# Patient Record
(demographics unavailable — no encounter records)

---

## 2024-10-21 NOTE — HEALTH RISK ASSESSMENT
[No] : In the past 12 months have you used drugs other than those required for medical reasons? No [0] : 2) Feeling down, depressed, or hopeless: Not at all (0) [PHQ-2 Negative - No further assessment needed] : PHQ-2 Negative - No further assessment needed [[id number]] : 0 [Never] : Never

## 2024-10-21 NOTE — HISTORY OF PRESENT ILLNESS
[FreeTextEntry1] : follow up chronic medical conditions.  [de-identified] : Ms. NEIL ESCOBAR is a 65 year female with a PMH of HLD (non compliant with statin medication) DM comes to the office for follow up of chronic medical conditions. Patient denies fever, cough SOB. No other complaints at this time.

## 2024-10-21 NOTE — PLAN
[FreeTextEntry1] : Trigger finger- patient was referred to Orthopedic hand surgeon.   Iron deficiency- patient will follow with Heme/Onc for iron infusions  HLD- patient non-compliant with statin medication, will test lipid panel today and call patient with results  DM- Patient will continue metformin 500 mg PO BID and Ozempic 2 mg SQ Qweekly  Prior to appointment and during encounter with patient extensive medical records were reviewed including but not limited to, Hospital records, out patient records, laboratory data and microbiology data    Total encounter total time 30 mins >50% of time spent counseling/coordinating care  Counseling included abnormal lab results, differential diagnoses, treatment options, risks and benefits, lifestyle changes, current condition, medications, and dose adjustments.  The patient was interactive, attentive, asked questions, and verbalized understanding

## 2024-10-21 NOTE — HISTORY OF PRESENT ILLNESS
[FreeTextEntry1] : follow up chronic medical conditions.  [de-identified] : Ms. NEIL ESCOBAR is a 65 year female with a PMH of HLD (non compliant with statin medication) DM comes to the office for follow up of chronic medical conditions. Patient denies fever, cough SOB. No other complaints at this time.

## 2024-10-21 NOTE — HEALTH RISK ASSESSMENT
[No] : In the past 12 months have you used drugs other than those required for medical reasons? No [0] : 2) Feeling down, depressed, or hopeless: Not at all (0) [PHQ-2 Negative - No further assessment needed] : PHQ-2 Negative - No further assessment needed [ZTQ1Vortc] : 0 [Never] : Never

## 2025-01-02 NOTE — HEALTH RISK ASSESSMENT
[Good] : ~his/her~  mood as  good [No] : In the past 12 months have you used drugs other than those required for medical reasons? No [No falls in past year] : Patient reported no falls in the past year [Little interest or pleasure doing things] : 1) Little interest or pleasure doing things [Feeling down, depressed, or hopeless] : 2) Feeling down, depressed, or hopeless [Patient refused screening] : Patient refused screening [PHQ-2 Negative - No further assessment needed] : PHQ-2 Negative - No further assessment needed [Never] : Never [Fully functional (bathing, dressing, toileting, transferring, walking, feeding)] : Fully functional (bathing, dressing, toileting, transferring, walking, feeding) [Fully functional (using the telephone, shopping, preparing meals, housekeeping, doing laundry, using] : Fully functional and needs no help or supervision to perform IADLs (using the telephone, shopping, preparing meals, housekeeping, doing laundry, using transportation, managing medications and managing finances) [Patient reported colonoscopy was normal] : Patient reported colonoscopy was normal [0] : 2) Feeling down, depressed, or hopeless: Not at all (0) [BGQ1Mctsz] : 0 [Patient declined mammogram] : Patient declined mammogram [Patient declined PAP Smear] : Patient declined PAP Smear [Patient declined bone density test] : Patient declined bone density test [Change in mental status noted] : No change in mental status noted [Language] : denies difficulty with language [Behavior] : denies difficulty with behavior [Learning/Retaining New Information] : denies difficulty learning/retaining new information [Alone] : lives alone [Single] : single [# Of Children ___] : has [unfilled] children [Reports changes in hearing] : Reports no changes in hearing [Reports changes in vision] : Reports no changes in vision [Reports normal functional visual acuity (ie: able to read med bottle)] : Reports poor functional visual acuity.  [Reports changes in dental health] : Reports no changes in dental health [MammogramDate] : 2020 [MammogramComments] : PT refused mammogram [PapSmearDate] : 2020 [PapSmearComments] : Pt refused PAP [ColonoscopyDate] : 2022 [de-identified] : Lives with dogs x 2

## 2025-01-02 NOTE — HISTORY OF PRESENT ILLNESS
[FreeTextEntry1] : Annual Physical  [de-identified] : 66-year-old female GERD on omeprazole, prediabetes on metformin and Ozempic, anemia of unknown origin, receives iron transfusions last transfusion was 1 month ago, history multiple blood transfusion. Presents to \[facility]\"" care. Seen by hematology at [facility]. Has chronic lumbar back pain. Follows with pain medicine at [facility]. Scheduled for b/l lumbar nerve ablation with Dr. Manzo 1/9 January 2025.  History of allergic reaction to blood transfusion. History of pulmonary embolism 2017 Multiple gastric surgeries and 13 inches of bowel resection. Multiple mesh hernia repairs.  Ozempic holding for procedure. Previous history of morbid obesity almost 500 lbs. Patient reports she fasted for an extended period of time lost over 200 pounds. At this time patient was evaluated by psychiatry due to refusal to eat. Was never admitted to psych hospital. Patient states she was pronounced dead and put into a morgue before hospital services realizing she was still alive.  History of taking large amounts of aspirin >24 pills in a few hour span. Has a deep mistrust in physicians due to lifetime of medical complications. Mother history of breast cancer with metastasis.   Follows with hematologist.  Denies SI/HI.

## 2025-01-02 NOTE — ASSESSMENT
[FreeTextEntry1] : 66-year-old female GERD on omeprazole, prediabetes and metformin, anemia of unknown origin, receives iron transfusions last transfusion was 1 month ago, history multiple blood transfusion. Presents to establish care.  # Chronic lumbar back pain.  Follows with pain medicine at [facility].  Scheduled for b/l lumbar nerve ablation with Dr. Manzo 1/9 January 2025.  #Iron Deficiency Anemia Follows with hematology will trend h/h - reviewed lab work at [facility] done on 12/26.  #Prediabetes  Continue metformin and Ozempic Sent A1c today will trend.  Patient denies routine health screening and appropriate vaccinations. Colonoscopy up to date.

## 2025-03-14 NOTE — HISTORY OF PRESENT ILLNESS
[de-identified] : The patient is a 66 year old right hand dominant female who presents today for LT KNEE pain. Date of Injury/Onset: October 2024 Pain: At Rest: 3/10 With Activity: 9/10 Mechanism of injury: This is NOT a Work Related Injury being treated under Worker's Compensation. This is NOT an athletic injury occurring associated with an interscholastic or organized sports team. Quality of symptoms: constant dull ache at rest, sharp pain posteriorly with movement Improves with: rest, activity modification  Worse with: inc walking, stairs, side sleeping Prior treatment: dx with baker cyst October 2024 by infusion physician (dx with US- was receiving iron and blood infusions)) Prior Imaging: US @ [facility] Out of work/sport: currently working  School/Sport/Position/Occupation:   Additional Information: reports medical hx of weight loss in the early 90's (was over 400 lbs) that resulted in inc skin. Interested in discussing gel treatment options   **diabetic**

## 2025-03-14 NOTE — IMAGING
[Left] : left knee [de-identified] : The patient is a well appearing 66 year old female of their stated age. Patient ambulates with a normal gait. Negative straight leg raise bilateral   Effected Knee: LEFT ROM:  0-130 degrees Lachman: Negative Pivot Shift: Negative Anterior Drawer: Negative Posterior Drawer / Sag: Negative Varus Stress 0 degrees: Stable Varus Stress 30 degrees: Stable Valgus Stress 0 degrees: Stable Valgus Stress 30 degrees: Stable Medial Marty: Negative Lateral Marty: Negative Patella Glide: 2+ Patella Apprehension: Negative Patella Grind: Negative   Palpation: Medial Joint Line: Nontender Lateral Joint Line: Nontender Medial Collateral Ligament: Nontender Lateral Collateral Ligament/PLC: Nontender Distal Femur: Nontender Proximal Tibia: Nontender Tibial Tubercle: Nontender Distal Pole Patella: Nontender Quadriceps Tendon: Nontender &  Intact Patella Tendon: Nontender &  Intact Medial Femoral Condyle: Nontender Medial Distal Hamstring/PES: Nontender Lateral Distal Hamstring: Nontender & Stable Iliotibial Band: Nontender Medial Patellofemoral Ligament: Nontender Adductor: Nontender Proximal GSC-Plantaris: Nontender Calf: Supple & Nontender   Inspection: Deformity: No Erythema: No Ecchymosis: No Abrasions: No Effusion: MILD Prepatella Bursitis: No Neurologic Exam: Sensation L4-S1: Grossly Intact Motor Exam: Quadriceps: 5 out of 5 Hamstrings: 5 out of 5 EHL: 5 out of 5 FHL: 5 out of 5 TA: 5 out of 5 GS: 5 out of 5 Circulatory/Pulses: Dorsalis Pedis: 2+ Posterior Tibialis: 2+ Additional Pertinent Findings: None     Contralateral Knee:                       ROM: 0-145 degrees Other Pertinent Findings: None   Assessment: The patient is a 66 year old female with Left knee pain and radiographic and physical exam findings consistent with patellofemoral OA The patient's condition is acute Documents/Results Reviewed Today: X-Ray left knee Tests/Studies Independently Interpreted Today: X-Ray left knee reveals evidence of end stage patellofemoral arthritis  Pertinent findings include: 0-140, Palpable baker's cyst, patellofemoral crepitus, Mild effusion Confounding medical conditions/concerns reports medical hx of weight loss in the early 90's (was over 400 lbs) that resulted in inc skin. Interested in discussing gel treatment options **diabetic**    Plan:  Discussed treatment options for the patient's patellofemoral arthritis, both operative vs non-operative. Patient will start physical therapy and HEP to focus on strengthening. Advised patient to obtain Reparel sleeve. Discussed taking Voltaren Gel. The patient is aware and understands that if she fails all conservative treatment modalities, she should consider a total knee arthroplasty. In the interim, we will focus on conservative management of arthritic related pain. Modify activity as discussed. Discussed treatment options in the form of injections to aid in pain, inflammation, and discomfort. Patient elected to receive Left knee Gelone. Advised patient to rest and ice the area as tolerated. Tests Ordered: None Prescription Medications Ordered: Discussed appropriate use of OTC anti-inflammatories and analgesics Tylenol Braces/DME Ordered: None Activity/Work/Sports Status: None Additional Instructions: Voltaren gel  Follow-Up: 1 week for right knee  Procedure Note: Musculoskeletal Injection   Diagnosis: Left knee OA Procedure: Left knee, superolateral, Gel One   Indication: The patient has had persistent pain despite conservative treatment.  Risks, benefits and alternatives to procedure were discussed; all questions were answered to the patient's apparent satisfaction and informed consent obtained. Consent form was signed and dated today.  The patient denied prior problems with local anesthetics, injectable cortisones, chicken allergy, coagulopathy and no relevant drug or preservative allergies or sensitivities.   The area of injection was prepared in a sterile fashion.  Prior to injection a 'Time Out' was conducted in accordance with [facility] policy and the site and nature of procedure verified with the patient.   Procedure: The injection and aspiration was carried out utilizing sterile technique from a superolateral arthroscopic portal position with needle placement under ultrasound guidance to improve accuracy and minimize risk to the patient and:    (X) Diagnostic ultrasound in the long and short axis revealed OA   0cc of clear synovial fluid was aspirated. The specimen: (X) appeared benign and was discarded ( ) was sent for Culture / Cell Count / Crystal analysis / [_].    Injection into the target area with care taken to aspirate frequently to minimize the risk of intravascular injection was performed with:    ( ) 1cc of Depomedrol (80mg/ml) ( ) 1cc of Dexamethasone (10mg/ml) ( ) 1cc of Toradol (30mg/ml) ( ) 9cc of 0.5% Bupivacaine (X) 5cc of 1% Lidocaine ( ) 5cc of 32mg Zilretta, prepared and diluted per  instructions ( ) 2 cc of Hylan G-F 20 (Synvisc) 16mg/2ml ( ) 6 cc of Hylan G-F 20 (SynvisoOne) 16mg/2ml ( ) 2cc of Euflexxa ( ) 2cc of Orthovisc (X) 2cc of GelOne ( ) 3cc of Durolane (20mg/ml)    Patient tolerated the procedure well and direct pressure was applied for hemostasis. The patient was reminded of potential post-injection risks including, but not limited to, delayed hypersensitivity reactions and/or infection.  The patient verified that they had the office and the Emergency Room's contact information if any problems should arise.  After several minutes, the patient informed me that they felt fine and was released from the office.  The patient's current medication management of their orthopedic diagnosis was reviewed today: The patient declined and was contraindicated for the recommended prescription medication Naprosyn secondary to gastric bypass surgery and will use over the counter Voltaren Gel as directed. (1) We discussed a comprehensive treatment plan that included possible pharmaceutical management involving the use of prescription strength medications including but not limited to options such as oral Naprosyn 500mg BID, once daily Meloxicam 15 mg, or 500-650 mg Tylenol versus over the counter oral medications and topical prescription NSAID Pennsaid vs over the counter Voltaren gel.  Based on our extensive discussion, the patient declined prescription medication as they are contraindicated as mentioned above and will use over the counter Voltaren Gel or Tylenol as directed. (2) There is a moderate risk of morbidity with further treatment, especially from use of prescription strength medications and possible side effects of these medications which include upset stomach with oral medications, skin reactions to topical medications and cardiac/renal issues with long term use. (3) I recommended that the patient follow-up with their medical physician to discuss any significant specific potential issues with long term medication use such as interactions with current medications or with exacerbation of underlying medical comorbidities. (4) The benefits and risks associated with use of injectable, oral or topical, prescription and over the counter anti-inflammatory medications were discussed with the patient. The patient voiced understanding of the risks including but not limited to bleeding, stroke, kidney dysfunction, heart disease, and were referred to the black box warning label for further information.  I, Daly Berrios attest that this documentation has been prepared under the direction and in the presence of Shazia Chester PA-C.  The documentation recorded by the scribe accurately reflects the service IShazia PA-C, personally performed and the decisions made by me. [FreeTextEntry9] : X-Ray left knee reveals evidence of end stage patellofemoral arthritis

## 2025-03-14 NOTE — HISTORY OF PRESENT ILLNESS
[de-identified] : The patient is a 66 year old right hand dominant female who presents today for LT KNEE pain. Date of Injury/Onset: October 2024 Pain: At Rest: 3/10 With Activity: 9/10 Mechanism of injury: This is NOT a Work Related Injury being treated under Worker's Compensation. This is NOT an athletic injury occurring associated with an interscholastic or organized sports team. Quality of symptoms: constant dull ache at rest, sharp pain posteriorly with movement Improves with: rest, activity modification  Worse with: inc walking, stairs, side sleeping Prior treatment: dx with baker cyst October 2024 by infusion physician (dx with US- was receiving iron and blood infusions)) Prior Imaging: US @ [facility] Out of work/sport: currently working  School/Sport/Position/Occupation:   Additional Information: reports medical hx of weight loss in the early 90's (was over 400 lbs) that resulted in inc skin. Interested in discussing gel treatment options   **diabetic**

## 2025-03-14 NOTE — IMAGING
[Left] : left knee [de-identified] : The patient is a well appearing 66 year old female of their stated age. Patient ambulates with a normal gait. Negative straight leg raise bilateral   Effected Knee: LEFT ROM:  0-130 degrees Lachman: Negative Pivot Shift: Negative Anterior Drawer: Negative Posterior Drawer / Sag: Negative Varus Stress 0 degrees: Stable Varus Stress 30 degrees: Stable Valgus Stress 0 degrees: Stable Valgus Stress 30 degrees: Stable Medial Marty: Negative Lateral Marty: Negative Patella Glide: 2+ Patella Apprehension: Negative Patella Grind: Negative   Palpation: Medial Joint Line: Nontender Lateral Joint Line: Nontender Medial Collateral Ligament: Nontender Lateral Collateral Ligament/PLC: Nontender Distal Femur: Nontender Proximal Tibia: Nontender Tibial Tubercle: Nontender Distal Pole Patella: Nontender Quadriceps Tendon: Nontender &  Intact Patella Tendon: Nontender &  Intact Medial Femoral Condyle: Nontender Medial Distal Hamstring/PES: Nontender Lateral Distal Hamstring: Nontender & Stable Iliotibial Band: Nontender Medial Patellofemoral Ligament: Nontender Adductor: Nontender Proximal GSC-Plantaris: Nontender Calf: Supple & Nontender   Inspection: Deformity: No Erythema: No Ecchymosis: No Abrasions: No Effusion: MILD Prepatella Bursitis: No Neurologic Exam: Sensation L4-S1: Grossly Intact Motor Exam: Quadriceps: 5 out of 5 Hamstrings: 5 out of 5 EHL: 5 out of 5 FHL: 5 out of 5 TA: 5 out of 5 GS: 5 out of 5 Circulatory/Pulses: Dorsalis Pedis: 2+ Posterior Tibialis: 2+ Additional Pertinent Findings: None     Contralateral Knee:                       ROM: 0-145 degrees Other Pertinent Findings: None   Assessment: The patient is a 66 year old female with Left knee pain and radiographic and physical exam findings consistent with patellofemoral OA The patient's condition is acute Documents/Results Reviewed Today: X-Ray left knee Tests/Studies Independently Interpreted Today: X-Ray left knee reveals evidence of end stage patellofemoral arthritis  Pertinent findings include: 0-140, Palpable baker's cyst, patellofemoral crepitus, Mild effusion Confounding medical conditions/concerns reports medical hx of weight loss in the early 90's (was over 400 lbs) that resulted in inc skin. Interested in discussing gel treatment options **diabetic**    Plan:  Discussed treatment options for the patient's patellofemoral arthritis, both operative vs non-operative. Patient will start physical therapy and HEP to focus on strengthening. Advised patient to obtain Reparel sleeve. Discussed taking Voltaren Gel. The patient is aware and understands that if she fails all conservative treatment modalities, she should consider a total knee arthroplasty. In the interim, we will focus on conservative management of arthritic related pain. Modify activity as discussed. Discussed treatment options in the form of injections to aid in pain, inflammation, and discomfort. Patient elected to receive Left knee Gelone. Advised patient to rest and ice the area as tolerated. Tests Ordered: None Prescription Medications Ordered: Discussed appropriate use of OTC anti-inflammatories and analgesics Tylenol Braces/DME Ordered: None Activity/Work/Sports Status: None Additional Instructions: Voltaren gel  Follow-Up: 1 week for right knee  Procedure Note: Musculoskeletal Injection   Diagnosis: Left knee OA Procedure: Left knee, superolateral, Gel One   Indication: The patient has had persistent pain despite conservative treatment.  Risks, benefits and alternatives to procedure were discussed; all questions were answered to the patient's apparent satisfaction and informed consent obtained. Consent form was signed and dated today.  The patient denied prior problems with local anesthetics, injectable cortisones, chicken allergy, coagulopathy and no relevant drug or preservative allergies or sensitivities.   The area of injection was prepared in a sterile fashion.  Prior to injection a 'Time Out' was conducted in accordance with [facility] policy and the site and nature of procedure verified with the patient.   Procedure: The injection and aspiration was carried out utilizing sterile technique from a superolateral arthroscopic portal position with needle placement under ultrasound guidance to improve accuracy and minimize risk to the patient and:    (X) Diagnostic ultrasound in the long and short axis revealed OA   0cc of clear synovial fluid was aspirated. The specimen: (X) appeared benign and was discarded ( ) was sent for Culture / Cell Count / Crystal analysis / [_].    Injection into the target area with care taken to aspirate frequently to minimize the risk of intravascular injection was performed with:    ( ) 1cc of Depomedrol (80mg/ml) ( ) 1cc of Dexamethasone (10mg/ml) ( ) 1cc of Toradol (30mg/ml) ( ) 9cc of 0.5% Bupivacaine (X) 5cc of 1% Lidocaine ( ) 5cc of 32mg Zilretta, prepared and diluted per  instructions ( ) 2 cc of Hylan G-F 20 (Synvisc) 16mg/2ml ( ) 6 cc of Hylan G-F 20 (SynvisoOne) 16mg/2ml ( ) 2cc of Euflexxa ( ) 2cc of Orthovisc (X) 2cc of GelOne ( ) 3cc of Durolane (20mg/ml)    Patient tolerated the procedure well and direct pressure was applied for hemostasis. The patient was reminded of potential post-injection risks including, but not limited to, delayed hypersensitivity reactions and/or infection.  The patient verified that they had the office and the Emergency Room's contact information if any problems should arise.  After several minutes, the patient informed me that they felt fine and was released from the office.  The patient's current medication management of their orthopedic diagnosis was reviewed today: The patient declined and was contraindicated for the recommended prescription medication Naprosyn secondary to gastric bypass surgery and will use over the counter Voltaren Gel as directed. (1) We discussed a comprehensive treatment plan that included possible pharmaceutical management involving the use of prescription strength medications including but not limited to options such as oral Naprosyn 500mg BID, once daily Meloxicam 15 mg, or 500-650 mg Tylenol versus over the counter oral medications and topical prescription NSAID Pennsaid vs over the counter Voltaren gel.  Based on our extensive discussion, the patient declined prescription medication as they are contraindicated as mentioned above and will use over the counter Voltaren Gel or Tylenol as directed. (2) There is a moderate risk of morbidity with further treatment, especially from use of prescription strength medications and possible side effects of these medications which include upset stomach with oral medications, skin reactions to topical medications and cardiac/renal issues with long term use. (3) I recommended that the patient follow-up with their medical physician to discuss any significant specific potential issues with long term medication use such as interactions with current medications or with exacerbation of underlying medical comorbidities. (4) The benefits and risks associated with use of injectable, oral or topical, prescription and over the counter anti-inflammatory medications were discussed with the patient. The patient voiced understanding of the risks including but not limited to bleeding, stroke, kidney dysfunction, heart disease, and were referred to the black box warning label for further information.  I, Daly Berrios attest that this documentation has been prepared under the direction and in the presence of Shazia Chester PA-C.  The documentation recorded by the scribe accurately reflects the service IShazia PA-C, personally performed and the decisions made by me. [FreeTextEntry9] : X-Ray left knee reveals evidence of end stage patellofemoral arthritis

## 2025-03-21 NOTE — HISTORY OF PRESENT ILLNESS
[de-identified] : The patient is a 66 year old right hand dominant female who presents today for LT KNEE pain. Date of Injury/Onset: October 2024 Pain: At Rest: 3/10 With Activity: 9/10 Mechanism of injury: This is NOT a Work Related Injury being treated under Worker's Compensation. This is NOT an athletic injury occurring associated with an interscholastic or organized sports team. Quality of symptoms: constant dull ache at rest, sharp pain posteriorly with movement Improves with: rest, activity modification  Worse with: inc walking, stairs, side sleeping Treatment/Imaging/Studies Since Last Visit: gel one injection 3/13/25 	Reports Available For Review Today: none Changes since last visit: Patient reports pain inc since injection. C/o shin pain at night that started after injection. Would like to discuss baker cyst treatment at this time. Would like to wait on RT knee injection since no x-rays taken yet Out of work/sport: currently working  School/Sport/Position/Occupation:   Additional Information: reports medical hx of weight loss in the early 90's (was over 400 lbs) that resulted in inc skin. Interested in discussing gel treatment options   **diabetic**

## 2025-03-21 NOTE — IMAGING
[Right] : right knee [AP] : anteroposterior [Lateral] : lateral [[place]] : skyline [AP Standing] : anteroposterior standing [de-identified] : The patient is a well appearing 66 year old female of their stated age. Patient ambulates with a normal gait. Negative straight leg raise bilateral   Effected Knee: RIGHT  ROM:  0-130 degrees Lachman: Negative Pivot Shift: Negative Anterior Drawer: Negative Posterior Drawer / Sag: Negative Varus Stress 0 degrees: Stable Varus Stress 30 degrees: Stable Valgus Stress 0 degrees: Stable Valgus Stress 30 degrees: Stable Medial Marty: Negative Lateral Marty: Negative Patella Glide: 2+ Patella Apprehension: Negative Patella Grind: POSITIVE    Palpation: Medial Joint Line: Nontender Lateral Joint Line: Nontender Medial Collateral Ligament: Nontender Lateral Collateral Ligament/PLC: Nontender Distal Femur: Nontender Proximal Tibia: Nontender Tibial Tubercle: Nontender Distal Pole Patella: Nontender Quadriceps Tendon: Nontender &  Intact Patella Tendon: Nontender &  Intact Medial Femoral Condyle: Nontender Medial Distal Hamstring/PES: Nontender Lateral Distal Hamstring: Nontender & Stable Iliotibial Band: Nontender Medial Patellofemoral Ligament: Nontender Adductor: Nontender Proximal GSC-Plantaris: Nontender Calf: Supple & Nontender   Inspection: Deformity: No Erythema: No Ecchymosis: No Abrasions: No Effusion: PITTING EDEMA, BILATERALLY  Prepatella Bursitis: No Neurologic Exam: Sensation L4-S1: Grossly Intact Motor Exam: Quadriceps: 4+ out of 5 Hamstrings: 5 out of 5 EHL: 5 out of 5 FHL: 5 out of 5 TA: 5 out of 5 GS: 5 out of 5 Circulatory/Pulses: Dorsalis Pedis: 2+ Posterior Tibialis: 2+ Additional Pertinent Findings: None     Contralateral Knee:                       ROM: 0-145 degrees Other Pertinent Findings: None   Assessment: The patient is a 66 year old female with right knee pain and radiographic and physical exam findings consistent with severe patellofemoral OA The patient's condition is acute Documents/Results Reviewed Today: X-Ray right knee Tests/Studies Independently Interpreted Today: X-Ray right knee reveals evidence of end stage patellofemoral arthritis, mild medial and lateral joint line narrowing.  Pertinent findings include: 0-130, 4+/5 quad strength, edema about bilateral lower extremities, +patellofemoral grind, no joint line tenderness.  Confounding medical conditions/concerns: Weight loss in the early 90's (was over 400 lbs) that resulted in inc skin. Diabetic. 3x blood infusions, adhesiolysis, cholecystectomy, gastric bypass surgery, ventral hernia repair.    Plan: We discussed treatment options for the patients severe patellofemoral arthritis in light of her medical history. Patient will start physical therapy and HEP to focus on strengthening. Advised patient to obtain Reparel knee sleeve. Prescribed patient Naproxen 500mg BID x 2 weeks, then PRN for pain management and inflammation - use as directed and take with food. We discussed the risks vs benefits of a corticosteroid vs toradol injection to cleave inflammatory process however, she is contraindicated given her past medical history. She is a candidate for visco supplementation injection to aid in patellofemoral symptoms however, she decides to defer. The patient elects to proceed with non-operative care with oral NSAIDs, therapy, and reparel knee sleeve. She ultimately may need to consider a total knee arthroplasty. Avoid inclines and stairs. Modify activity as discussed.  Tests Ordered: None Prescription Medications Ordered: Discussed appropriate use of OTC anti-inflammatories and analgesics Tylenol Braces/DME Ordered: Reparel knee sleeve  Activity/Work/Sports Status: None Additional Instructions: Voltaren gel  Follow-Up: 2 weeks   The patient's current medication management of their orthopedic diagnosis was reviewed today: The patient declined and was contraindicated for the recommended prescription medication Naprosyn secondary to Diabetic. 3x blood infusions, adhesiolysis, cholecystectomy, gastric bypass surgery, ventral hernia repair and will use over the counter Voltaren Gel as directed. (1) We discussed a comprehensive treatment plan that included possible pharmaceutical management involving the use of prescription strength medications including but not limited to options such as oral Naprosyn 500mg BID, once daily Meloxicam 15 mg, or 500-650 mg Tylenol versus over the counter oral medications and topical prescription NSAID Pennsaid vs over the counter Voltaren gel.  Based on our extensive discussion, the patient declined prescription medication as they are contraindicated as mentioned above and will use over the counter Voltaren Gel or Tylenol as directed. (2) There is a moderate risk of morbidity with further treatment, especially from use of prescription strength medications and possible side effects of these medications which include upset stomach with oral medications, skin reactions to topical medications and cardiac/renal issues with long term use. (3) I recommended that the patient follow-up with their medical physician to discuss any significant specific potential issues with long term medication use such as interactions with current medications or with exacerbation of underlying medical comorbidities. (4) The benefits and risks associated with use of injectable, oral or topical, prescription and over the counter anti-inflammatory medications were discussed with the patient. The patient voiced understanding of the risks including but not limited to bleeding, stroke, kidney dysfunction, heart disease, and were referred to the black box warning label for further information.   IJacquie attest that this documentation has been prepared under the direction and in the presence of Provider Dr. Reece Wick.   The documentation recorded by the scribe accurately reflects the services IDr. Reece, personally performed and the decisions made by me. [FreeTextEntry9] : X-Ray right knee reveals evidence of end stage patellofemoral arthritis, mild medial and lateral joint line narrowing.

## 2025-03-21 NOTE — HISTORY OF PRESENT ILLNESS
[de-identified] : The patient is a 66 year old right hand dominant female who presents today for LT KNEE pain. Date of Injury/Onset: October 2024 Pain: At Rest: 3/10 With Activity: 9/10 Mechanism of injury: This is NOT a Work Related Injury being treated under Worker's Compensation. This is NOT an athletic injury occurring associated with an interscholastic or organized sports team. Quality of symptoms: constant dull ache at rest, sharp pain posteriorly with movement Improves with: rest, activity modification  Worse with: inc walking, stairs, side sleeping Treatment/Imaging/Studies Since Last Visit: gel one injection 3/13/25 	Reports Available For Review Today: none Changes since last visit: Patient reports pain inc since injection. C/o shin pain at night that started after injection. Would like to discuss baker cyst treatment at this time. Would like to wait on RT knee injection since no x-rays taken yet Out of work/sport: currently working  School/Sport/Position/Occupation:   Additional Information: reports medical hx of weight loss in the early 90's (was over 400 lbs) that resulted in inc skin. Interested in discussing gel treatment options   **diabetic**

## 2025-03-21 NOTE — IMAGING
[Right] : right knee [AP] : anteroposterior [Lateral] : lateral [[place]] : skyline [AP Standing] : anteroposterior standing [de-identified] : The patient is a well appearing 66 year old female of their stated age. Patient ambulates with a normal gait. Negative straight leg raise bilateral   Effected Knee: RIGHT  ROM:  0-130 degrees Lachman: Negative Pivot Shift: Negative Anterior Drawer: Negative Posterior Drawer / Sag: Negative Varus Stress 0 degrees: Stable Varus Stress 30 degrees: Stable Valgus Stress 0 degrees: Stable Valgus Stress 30 degrees: Stable Medial Marty: Negative Lateral Marty: Negative Patella Glide: 2+ Patella Apprehension: Negative Patella Grind: POSITIVE    Palpation: Medial Joint Line: Nontender Lateral Joint Line: Nontender Medial Collateral Ligament: Nontender Lateral Collateral Ligament/PLC: Nontender Distal Femur: Nontender Proximal Tibia: Nontender Tibial Tubercle: Nontender Distal Pole Patella: Nontender Quadriceps Tendon: Nontender &  Intact Patella Tendon: Nontender &  Intact Medial Femoral Condyle: Nontender Medial Distal Hamstring/PES: Nontender Lateral Distal Hamstring: Nontender & Stable Iliotibial Band: Nontender Medial Patellofemoral Ligament: Nontender Adductor: Nontender Proximal GSC-Plantaris: Nontender Calf: Supple & Nontender   Inspection: Deformity: No Erythema: No Ecchymosis: No Abrasions: No Effusion: PITTING EDEMA, BILATERALLY  Prepatella Bursitis: No Neurologic Exam: Sensation L4-S1: Grossly Intact Motor Exam: Quadriceps: 4+ out of 5 Hamstrings: 5 out of 5 EHL: 5 out of 5 FHL: 5 out of 5 TA: 5 out of 5 GS: 5 out of 5 Circulatory/Pulses: Dorsalis Pedis: 2+ Posterior Tibialis: 2+ Additional Pertinent Findings: None     Contralateral Knee:                       ROM: 0-145 degrees Other Pertinent Findings: None   Assessment: The patient is a 66 year old female with right knee pain and radiographic and physical exam findings consistent with severe patellofemoral OA The patient's condition is acute Documents/Results Reviewed Today: X-Ray right knee Tests/Studies Independently Interpreted Today: X-Ray right knee reveals evidence of end stage patellofemoral arthritis, mild medial and lateral joint line narrowing.  Pertinent findings include: 0-130, 4+/5 quad strength, edema about bilateral lower extremities, +patellofemoral grind, no joint line tenderness.  Confounding medical conditions/concerns: Weight loss in the early 90's (was over 400 lbs) that resulted in inc skin. Diabetic. 3x blood infusions, adhesiolysis, cholecystectomy, gastric bypass surgery, ventral hernia repair.    Plan: We discussed treatment options for the patients severe patellofemoral arthritis in light of her medical history. Patient will start physical therapy and HEP to focus on strengthening. Advised patient to obtain Reparel knee sleeve. Prescribed patient Naproxen 500mg BID x 2 weeks, then PRN for pain management and inflammation - use as directed and take with food. We discussed the risks vs benefits of a corticosteroid vs toradol injection to cleave inflammatory process however, she is contraindicated given her past medical history. She is a candidate for visco supplementation injection to aid in patellofemoral symptoms however, she decides to defer. The patient elects to proceed with non-operative care with oral NSAIDs, therapy, and reparel knee sleeve. She ultimately may need to consider a total knee arthroplasty. Avoid inclines and stairs. Modify activity as discussed.  Tests Ordered: None Prescription Medications Ordered: Discussed appropriate use of OTC anti-inflammatories and analgesics Tylenol Braces/DME Ordered: Reparel knee sleeve  Activity/Work/Sports Status: None Additional Instructions: Voltaren gel  Follow-Up: 2 weeks   The patient's current medication management of their orthopedic diagnosis was reviewed today: The patient declined and was contraindicated for the recommended prescription medication Naprosyn secondary to Diabetic. 3x blood infusions, adhesiolysis, cholecystectomy, gastric bypass surgery, ventral hernia repair and will use over the counter Voltaren Gel as directed. (1) We discussed a comprehensive treatment plan that included possible pharmaceutical management involving the use of prescription strength medications including but not limited to options such as oral Naprosyn 500mg BID, once daily Meloxicam 15 mg, or 500-650 mg Tylenol versus over the counter oral medications and topical prescription NSAID Pennsaid vs over the counter Voltaren gel.  Based on our extensive discussion, the patient declined prescription medication as they are contraindicated as mentioned above and will use over the counter Voltaren Gel or Tylenol as directed. (2) There is a moderate risk of morbidity with further treatment, especially from use of prescription strength medications and possible side effects of these medications which include upset stomach with oral medications, skin reactions to topical medications and cardiac/renal issues with long term use. (3) I recommended that the patient follow-up with their medical physician to discuss any significant specific potential issues with long term medication use such as interactions with current medications or with exacerbation of underlying medical comorbidities. (4) The benefits and risks associated with use of injectable, oral or topical, prescription and over the counter anti-inflammatory medications were discussed with the patient. The patient voiced understanding of the risks including but not limited to bleeding, stroke, kidney dysfunction, heart disease, and were referred to the black box warning label for further information.   IJacquie attest that this documentation has been prepared under the direction and in the presence of Provider Dr. Reece Wick.   The documentation recorded by the scribe accurately reflects the services IDr. Reece, personally performed and the decisions made by me. [FreeTextEntry9] : X-Ray right knee reveals evidence of end stage patellofemoral arthritis, mild medial and lateral joint line narrowing.

## 2025-04-03 NOTE — HISTORY OF PRESENT ILLNESS
[de-identified] : The patient is a 66 year old right hand dominant female who presents today for LT KNEE pain. Date of Injury/Onset: October 2024 Pain: At Rest: 3/10 With Activity: 9/10 Mechanism of injury: This is NOT a Work Related Injury being treated under Worker's Compensation. This is NOT an athletic injury occurring associated with an interscholastic or organized sports team. Quality of symptoms: constant dull ache at rest, sharp pain posteriorly with movement Improves with: rest, activity modification  Worse with: inc walking, stairs, side sleeping Treatment/Imaging/Studies Since Last Visit: gel one injection 3/13/25 	Reports Available For Review Today: none Changes since last visit: Patient reports dec in swelling since medication School/Sport/Position/Occupation:   Additional Information: reports medical hx of weight loss in the early 90's (was over 400 lbs) that resulted in inc skin. Interested in discussing gel treatment options   **diabetic**

## 2025-04-03 NOTE — IMAGING
[Right] : right knee [AP] : anteroposterior [Lateral] : lateral [[place]] : skyline [AP Standing] : anteroposterior standing [FreeTextEntry9] : X-Ray right knee reveals evidence of end stage patellofemoral arthritis, mild medial and lateral joint line narrowing.  [de-identified] : The patient is a well appearing 66 year old female of their stated age. Patient ambulates with a normal gait. Negative straight leg raise bilateral   Effected Knee: RIGHT  ROM:  0-130 degrees Lachman: Negative Pivot Shift: Negative Anterior Drawer: Negative Posterior Drawer / Sag: Negative Varus Stress 0 degrees: Stable Varus Stress 30 degrees: Stable Valgus Stress 0 degrees: Stable Valgus Stress 30 degrees: Stable Medial Marty: Negative Lateral Marty: Negative Patella Glide: 2+ Patella Apprehension: Negative Patella Grind: POSITIVE    Palpation: Medial Joint Line: Nontender Lateral Joint Line: Nontender Medial Collateral Ligament: Nontender Lateral Collateral Ligament/PLC: Nontender Distal Femur: Nontender Proximal Tibia: Nontender Tibial Tubercle: Nontender Distal Pole Patella: Nontender Quadriceps Tendon: Nontender &  Intact Patella Tendon: Nontender &  Intact Medial Femoral Condyle: Nontender Medial Distal Hamstring/PES: Nontender Lateral Distal Hamstring: Nontender & Stable Iliotibial Band: Nontender Medial Patellofemoral Ligament: Nontender Adductor: Nontender Proximal GSC-Plantaris: Nontender Calf: Supple & Nontender   Inspection: Deformity: No Erythema: No Ecchymosis: No Abrasions: No Effusion: PITTING EDEMA, BILATERALLY  Prepatella Bursitis: No Neurologic Exam: Sensation L4-S1: Grossly Intact Motor Exam: Quadriceps: 4+ out of 5 Hamstrings: 5 out of 5 EHL: 5 out of 5 FHL: 5 out of 5 TA: 5 out of 5 GS: 5 out of 5 Circulatory/Pulses: Dorsalis Pedis: 2+ Posterior Tibialis: 2+ Additional Pertinent Findings: None     Contralateral Knee:                       ROM: 0-145 degrees Other Pertinent Findings: None   Assessment: The patient is a 66 year old female with right knee pain and radiographic and physical exam findings consistent with severe patellofemoral OA.  Acute exacerbation of chronic condition.  Documents/Results Reviewed Today: None  Tests/Studies Independently Interpreted Today: None  Pertinent findings include: 0-130, 4+/5 quad strength, pitting edema about bilateral lower extremities, +patellofemoral grind, no joint line tenderness.  Confounding medical conditions/concerns: Weight loss in the early 90's (was over 400 lbs) that resulted in inc skin. Diabetic. 3x blood infusions, adhesiolysis, cholecystectomy, gastric bypass surgery, ventral hernia repair.    Plan: The patient reports improvement in her knee effusion and bakers cyst since use of Indomethacin 50mg. We discussed the risks of prolonged use of Indomethacin however, she reports obtaining clearance from her gastroenterologist. We informed the patient that her bilateral lower extremity pitting edema is likely cardio related opposed to from her knee. Again, we discussed treatment options for the patient's severe patellofemoral arthritis in light of her medical history. Patient will continue physical therapy and HEP to focus on strengthening. Recommended she obtain reparel knee sleeve. We will not prescribe the patient another course of Indomethacin, she can follow up with pain management for further discussion. We discussed the risks vs benefits of a corticosteroid vs toradol injection to cleave inflammatory process however, she is contraindicated given her past medical history. She ultimately may need to consider a total knee arthroplasty. Avoid inclines and stairs. Modify activity as discussed.  Tests Ordered: None Prescription Medications Ordered: Discussed appropriate use of OTC anti-inflammatories and analgesics (including but not limited to Aleve, Advil, Tylenol, Motrin, Ibuprofen, Voltaren gel, etc.)  Braces/DME Ordered: Reparel knee sleeve  Activity/Work/Sports Status: None Additional Instructions: Voltaren gel  Follow-Up: PRN   The patient's current medication management of their orthopedic diagnosis was reviewed today: The patient declined and/or was contraindicated for the recommended prescription medication Naprosyn and will use over the counter Advil, Aleve, Voltaren Gel or Tylenol as directed. (1) We discussed a comprehensive treatment plan that included possible pharmaceutical management involving the use of prescription strength medications including but not limited to options such as oral Naprosyn 500mg BID, once daily Meloxicam 15 mg, or 500-650 mg Tylenol versus over the counter oral medications and topical prescription NSAID Pennsaid vs over the counter Voltaren gel.  Based on our extensive discussion, the patient declined prescription medication and will use over the counter Advil, Alleve, Voltaren Gel or Tylenol as directed. (2) There is a moderate risk of morbidity with further treatment, especially from use of prescription strength medications and possible side effects of these medications which include upset stomach with oral medications, skin reactions to topical medications and cardiac/renal issues with long term use. (3) I recommended that the patient follow-up with their medical physician to discuss any significant specific potential issues with long term medication use such as interactions with current medications or with exacerbation of underlying medical comorbidities. (4) The benefits and risks associated with use of injectable, oral or topical, prescription and over the counter anti-inflammatory medications were discussed with the patient. The patient voiced understanding of the risks including but not limited to bleeding, stroke, kidney dysfunction, heart disease, and were referred to the black box warning label for further information.   IJacquie attest that this documentation has been prepared under the direction and in the presence of Provider Dr. Reece Wick.

## 2025-04-03 NOTE — IMAGING
[Right] : right knee [AP] : anteroposterior [Lateral] : lateral [[place]] : skyline [AP Standing] : anteroposterior standing [FreeTextEntry9] : X-Ray right knee reveals evidence of end stage patellofemoral arthritis, mild medial and lateral joint line narrowing.  [de-identified] : The patient is a well appearing 66 year old female of their stated age. Patient ambulates with a normal gait. Negative straight leg raise bilateral   Effected Knee: RIGHT  ROM:  0-130 degrees Lachman: Negative Pivot Shift: Negative Anterior Drawer: Negative Posterior Drawer / Sag: Negative Varus Stress 0 degrees: Stable Varus Stress 30 degrees: Stable Valgus Stress 0 degrees: Stable Valgus Stress 30 degrees: Stable Medial Marty: Negative Lateral Marty: Negative Patella Glide: 2+ Patella Apprehension: Negative Patella Grind: POSITIVE    Palpation: Medial Joint Line: Nontender Lateral Joint Line: Nontender Medial Collateral Ligament: Nontender Lateral Collateral Ligament/PLC: Nontender Distal Femur: Nontender Proximal Tibia: Nontender Tibial Tubercle: Nontender Distal Pole Patella: Nontender Quadriceps Tendon: Nontender &  Intact Patella Tendon: Nontender &  Intact Medial Femoral Condyle: Nontender Medial Distal Hamstring/PES: Nontender Lateral Distal Hamstring: Nontender & Stable Iliotibial Band: Nontender Medial Patellofemoral Ligament: Nontender Adductor: Nontender Proximal GSC-Plantaris: Nontender Calf: Supple & Nontender   Inspection: Deformity: No Erythema: No Ecchymosis: No Abrasions: No Effusion: PITTING EDEMA, BILATERALLY  Prepatella Bursitis: No Neurologic Exam: Sensation L4-S1: Grossly Intact Motor Exam: Quadriceps: 4+ out of 5 Hamstrings: 5 out of 5 EHL: 5 out of 5 FHL: 5 out of 5 TA: 5 out of 5 GS: 5 out of 5 Circulatory/Pulses: Dorsalis Pedis: 2+ Posterior Tibialis: 2+ Additional Pertinent Findings: None     Contralateral Knee:                       ROM: 0-145 degrees Other Pertinent Findings: None   Assessment: The patient is a 66 year old female with right knee pain and radiographic and physical exam findings consistent with severe patellofemoral OA.  Acute exacerbation of chronic condition.  Documents/Results Reviewed Today: None  Tests/Studies Independently Interpreted Today: None  Pertinent findings include: 0-130, 4+/5 quad strength, pitting edema about bilateral lower extremities, +patellofemoral grind, no joint line tenderness.  Confounding medical conditions/concerns: Weight loss in the early 90's (was over 400 lbs) that resulted in inc skin. Diabetic. 3x blood infusions, adhesiolysis, cholecystectomy, gastric bypass surgery, ventral hernia repair.    Plan: The patient reports improvement in her knee effusion and bakers cyst since use of Indomethacin 50mg. We discussed the risks of prolonged use of Indomethacin however, she reports obtaining clearance from her gastroenterologist. We informed the patient that her bilateral lower extremity pitting edema is likely cardio related opposed to from her knee. Again, we discussed treatment options for the patient's severe patellofemoral arthritis in light of her medical history. Patient will continue physical therapy and HEP to focus on strengthening. Recommended she obtain reparel knee sleeve. We will not prescribe the patient another course of Indomethacin, she can follow up with pain management for further discussion. We discussed the risks vs benefits of a corticosteroid vs toradol injection to cleave inflammatory process however, she is contraindicated given her past medical history. She ultimately may need to consider a total knee arthroplasty. Avoid inclines and stairs. Modify activity as discussed.  Tests Ordered: None Prescription Medications Ordered: Discussed appropriate use of OTC anti-inflammatories and analgesics (including but not limited to Aleve, Advil, Tylenol, Motrin, Ibuprofen, Voltaren gel, etc.)  Braces/DME Ordered: Reparel knee sleeve  Activity/Work/Sports Status: None Additional Instructions: Voltaren gel  Follow-Up: PRN   The patient's current medication management of their orthopedic diagnosis was reviewed today: The patient declined and/or was contraindicated for the recommended prescription medication Naprosyn and will use over the counter Advil, Aleve, Voltaren Gel or Tylenol as directed. (1) We discussed a comprehensive treatment plan that included possible pharmaceutical management involving the use of prescription strength medications including but not limited to options such as oral Naprosyn 500mg BID, once daily Meloxicam 15 mg, or 500-650 mg Tylenol versus over the counter oral medications and topical prescription NSAID Pennsaid vs over the counter Voltaren gel.  Based on our extensive discussion, the patient declined prescription medication and will use over the counter Advil, Alleve, Voltaren Gel or Tylenol as directed. (2) There is a moderate risk of morbidity with further treatment, especially from use of prescription strength medications and possible side effects of these medications which include upset stomach with oral medications, skin reactions to topical medications and cardiac/renal issues with long term use. (3) I recommended that the patient follow-up with their medical physician to discuss any significant specific potential issues with long term medication use such as interactions with current medications or with exacerbation of underlying medical comorbidities. (4) The benefits and risks associated with use of injectable, oral or topical, prescription and over the counter anti-inflammatory medications were discussed with the patient. The patient voiced understanding of the risks including but not limited to bleeding, stroke, kidney dysfunction, heart disease, and were referred to the black box warning label for further information.   IJacquie attest that this documentation has been prepared under the direction and in the presence of Provider Dr. Reece Wick.

## 2025-04-16 NOTE — ASSESSMENT
[FreeTextEntry1] : Patient showing symptoms of peripheral neuropathy. Job-related stress could be contributing to symptoms. The knee issue and missed medications also need to be addressed. - Problems : - Peripheral neuropathy> Cervical and Lumbar Radiculopathy -Reports having MRI's with DR Manzo.  - Job-related stress  - Knee disorder   Patient is advised to seek f/up with Dr. Manzo with whom she was treated in the past Continue current meds> refill Indomethacine and Ozempic.  We will see if job-related stress is aggravating her symptoms> Advise for Counseling.  The patient has been advised to call me back if there are any issues with medication pick-ups from the pharmacy.

## 2025-04-16 NOTE — HISTORY OF PRESENT ILLNESS
[FreeTextEntry8] : April Ramos, 66 years old, presents with new onset numbness and tingling in her arms and legs for the past few days. She also has complaints concerning her medications not being received, as well as job-related stress. Patient reports new onset numbness around mouth, face and forehead and tingling in arms and legs for the past few days, with no prior history of such symptoms. She describes the sensation as akin to her extremities falling asleep.  Patient has a history of arthritis, for which she takes Indomethacin as well as ongoing issues with her knees. The patient had procedures in 2017 for nerve issues in the neck performed by Dr. Manzo. She also had a procedure recently for nerves in her back. he patient is a . She is currently experiencing high levels of stress at work due to concerns about job security.